# Patient Record
Sex: MALE | Race: OTHER | NOT HISPANIC OR LATINO
[De-identification: names, ages, dates, MRNs, and addresses within clinical notes are randomized per-mention and may not be internally consistent; named-entity substitution may affect disease eponyms.]

---

## 2017-06-16 ENCOUNTER — APPOINTMENT (OUTPATIENT)
Dept: OPHTHALMOLOGY | Facility: CLINIC | Age: 77
End: 2017-06-16

## 2017-06-16 DIAGNOSIS — H35.413 LATTICE DEGENERATION OF RETINA, BILATERAL: ICD-10-CM

## 2017-06-16 DIAGNOSIS — H33.002 UNSPECIFIED RETINAL DETACHMENT WITH RETINAL BREAK, LEFT EYE: ICD-10-CM

## 2017-06-16 DIAGNOSIS — H26.9 UNSPECIFIED CATARACT: ICD-10-CM

## 2017-06-16 DIAGNOSIS — H43.813 VITREOUS DEGENERATION, BILATERAL: ICD-10-CM

## 2017-06-16 DIAGNOSIS — H35.3130 NONEXUDATIVE AGE-RELATED MACULAR DEGENERATION, BILATERAL, STAGE UNSPECIFIED: ICD-10-CM

## 2017-12-15 ENCOUNTER — APPOINTMENT (OUTPATIENT)
Dept: OPHTHALMOLOGY | Facility: CLINIC | Age: 77
End: 2017-12-15

## 2020-10-23 ENCOUNTER — NON-APPOINTMENT (OUTPATIENT)
Age: 80
End: 2020-10-23

## 2020-10-23 ENCOUNTER — APPOINTMENT (OUTPATIENT)
Dept: OPHTHALMOLOGY | Facility: CLINIC | Age: 80
End: 2020-10-23
Payer: MEDICARE

## 2020-10-23 ENCOUNTER — APPOINTMENT (OUTPATIENT)
Dept: OPHTHALMOLOGY | Facility: CLINIC | Age: 80
End: 2020-10-23

## 2020-10-23 PROCEDURE — 92002 INTRM OPH EXAM NEW PATIENT: CPT

## 2020-11-11 ENCOUNTER — NON-APPOINTMENT (OUTPATIENT)
Age: 80
End: 2020-11-11

## 2020-11-11 ENCOUNTER — APPOINTMENT (OUTPATIENT)
Dept: OPHTHALMOLOGY | Facility: CLINIC | Age: 80
End: 2020-11-11
Payer: MEDICARE

## 2020-11-11 PROCEDURE — 92134 CPTRZ OPH DX IMG PST SGM RTA: CPT

## 2020-11-11 PROCEDURE — 92014 COMPRE OPH EXAM EST PT 1/>: CPT

## 2020-11-11 PROCEDURE — 92201 OPSCPY EXTND RTA DRAW UNI/BI: CPT

## 2021-05-12 ENCOUNTER — NON-APPOINTMENT (OUTPATIENT)
Age: 81
End: 2021-05-12

## 2021-05-12 ENCOUNTER — APPOINTMENT (OUTPATIENT)
Dept: OPHTHALMOLOGY | Facility: CLINIC | Age: 81
End: 2021-05-12
Payer: MEDICARE

## 2021-05-12 PROCEDURE — 92134 CPTRZ OPH DX IMG PST SGM RTA: CPT

## 2021-05-12 PROCEDURE — 92201 OPSCPY EXTND RTA DRAW UNI/BI: CPT

## 2021-05-12 PROCEDURE — 92014 COMPRE OPH EXAM EST PT 1/>: CPT

## 2021-11-18 ENCOUNTER — APPOINTMENT (OUTPATIENT)
Dept: OPHTHALMOLOGY | Facility: CLINIC | Age: 81
End: 2021-11-18

## 2021-12-01 ENCOUNTER — NON-APPOINTMENT (OUTPATIENT)
Age: 81
End: 2021-12-01

## 2021-12-01 ENCOUNTER — APPOINTMENT (OUTPATIENT)
Dept: OPHTHALMOLOGY | Facility: CLINIC | Age: 81
End: 2021-12-01
Payer: MEDICARE

## 2021-12-01 PROCEDURE — 92014 COMPRE OPH EXAM EST PT 1/>: CPT

## 2021-12-01 PROCEDURE — 92134 CPTRZ OPH DX IMG PST SGM RTA: CPT

## 2021-12-01 PROCEDURE — 92201 OPSCPY EXTND RTA DRAW UNI/BI: CPT

## 2022-02-08 ENCOUNTER — APPOINTMENT (OUTPATIENT)
Dept: OPHTHALMOLOGY | Facility: CLINIC | Age: 82
End: 2022-02-08
Payer: MEDICARE

## 2022-02-08 ENCOUNTER — NON-APPOINTMENT (OUTPATIENT)
Age: 82
End: 2022-02-08

## 2022-02-08 PROCEDURE — 92014 COMPRE OPH EXAM EST PT 1/>: CPT

## 2022-03-15 ENCOUNTER — APPOINTMENT (OUTPATIENT)
Dept: OPHTHALMOLOGY | Facility: CLINIC | Age: 82
End: 2022-03-15
Payer: MEDICARE

## 2022-03-15 ENCOUNTER — NON-APPOINTMENT (OUTPATIENT)
Age: 82
End: 2022-03-15

## 2022-03-15 PROCEDURE — 92012 INTRM OPH EXAM EST PATIENT: CPT

## 2022-03-15 PROCEDURE — 92133 CPTRZD OPH DX IMG PST SGM ON: CPT

## 2022-03-15 PROCEDURE — 92083 EXTENDED VISUAL FIELD XM: CPT

## 2022-03-26 ENCOUNTER — EMERGENCY (EMERGENCY)
Facility: HOSPITAL | Age: 82
LOS: 1 days | Discharge: HOME | End: 2022-03-26
Admitting: EMERGENCY MEDICINE
Payer: COMMERCIAL

## 2022-03-26 PROCEDURE — 99285 EMERGENCY DEPT VISIT HI MDM: CPT

## 2022-03-26 PROCEDURE — 70450 CT HEAD/BRAIN W/O DYE: CPT | Mod: 26

## 2022-03-26 PROCEDURE — 72125 CT NECK SPINE W/O DYE: CPT | Mod: 26
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
## 2022-03-28 DIAGNOSIS — R51.9 HEADACHE, UNSPECIFIED: ICD-10-CM
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
## 2022-08-18 ENCOUNTER — APPOINTMENT (OUTPATIENT)
Dept: OPHTHALMOLOGY | Facility: CLINIC | Age: 82
End: 2022-08-18

## 2022-08-18 ENCOUNTER — NON-APPOINTMENT (OUTPATIENT)
Age: 82
End: 2022-08-18

## 2022-08-18 PROCEDURE — 92014 COMPRE OPH EXAM EST PT 1/>: CPT

## 2022-08-18 PROCEDURE — 92201 OPSCPY EXTND RTA DRAW UNI/BI: CPT

## 2022-08-18 PROCEDURE — 92134 CPTRZ OPH DX IMG PST SGM RTA: CPT

## 2022-10-05 ENCOUNTER — NON-APPOINTMENT (OUTPATIENT)
Age: 82
End: 2022-10-05

## 2022-10-05 ENCOUNTER — APPOINTMENT (OUTPATIENT)
Dept: OPHTHALMOLOGY | Facility: CLINIC | Age: 82
End: 2022-10-05

## 2022-10-05 PROCEDURE — 92083 EXTENDED VISUAL FIELD XM: CPT

## 2022-10-05 PROCEDURE — 92134 CPTRZ OPH DX IMG PST SGM RTA: CPT

## 2022-10-05 PROCEDURE — 92012 INTRM OPH EXAM EST PATIENT: CPT

## 2022-10-05 PROCEDURE — 92136 OPHTHALMIC BIOMETRY: CPT

## 2023-02-23 ENCOUNTER — NON-APPOINTMENT (OUTPATIENT)
Age: 83
End: 2023-02-23

## 2023-02-23 ENCOUNTER — APPOINTMENT (OUTPATIENT)
Dept: OPHTHALMOLOGY | Facility: CLINIC | Age: 83
End: 2023-02-23
Payer: MEDICARE

## 2023-02-23 PROCEDURE — 92014 COMPRE OPH EXAM EST PT 1/>: CPT

## 2023-02-23 PROCEDURE — 92134 CPTRZ OPH DX IMG PST SGM RTA: CPT

## 2023-02-23 PROCEDURE — 92201 OPSCPY EXTND RTA DRAW UNI/BI: CPT

## 2023-04-05 ENCOUNTER — APPOINTMENT (OUTPATIENT)
Dept: OPHTHALMOLOGY | Facility: CLINIC | Age: 83
End: 2023-04-05
Payer: MEDICARE

## 2023-04-05 ENCOUNTER — NON-APPOINTMENT (OUTPATIENT)
Age: 83
End: 2023-04-05

## 2023-04-05 PROCEDURE — 92012 INTRM OPH EXAM EST PATIENT: CPT

## 2023-06-09 RX ORDER — SODIUM CHLORIDE 9 MG/ML
1000 INJECTION, SOLUTION INTRAVENOUS
Refills: 0 | Status: DISCONTINUED | OUTPATIENT
Start: 2023-06-12 | End: 2023-06-12

## 2023-06-09 NOTE — ASU PATIENT PROFILE, ADULT - NS PREOP UNDERSTANDS INFO
No solid food/dairy/candy/gum after 22:00pm Sunday, water allowed before 05:00am Monday. Patient reminded to come with photo ID/insurance card, dress comfortable/no jewelries/contact lens/valuables, no smoking/alcohol/recreational drug use Sunday; escort to come with photo ID, address and callback number was given./yes

## 2023-06-09 NOTE — ASU PATIENT PROFILE, ADULT - NSICDXPASTSURGICALHX_GEN_ALL_CORE_FT
PAST SURGICAL HISTORY:  H/O arthroscopy of knee right    H/O basal cell carcinoma excision Abdomen and left arm    H/O inguinal hernia repair right    H/O parathyroidectomy     H/O prostatectomy     H/O retinal detachment     Status post coronary artery stent placement

## 2023-06-09 NOTE — ASU PATIENT PROFILE, ADULT - FALL HARM RISK - CONCLUSION
This is a recent snapshot of the patient's [facility] medical record.  For current drug dose and complete information and questions, call [phone] or In Basket pool, [facility] ([id number])  CSN Number:  [id number]      
Universal Safety Interventions

## 2023-06-09 NOTE — ASU PATIENT PROFILE, ADULT - NSICDXPASTMEDICALHX_GEN_ALL_CORE_FT
PAST MEDICAL HISTORY:  Acute myocardial infarction     Afib     Carcinomas, basal cell     GERD (gastroesophageal reflux disease)     H/O erectile dysfunction     H/O retinal detachment left    Hypercholesterolemia     Prostate cancer

## 2023-06-12 ENCOUNTER — OUTPATIENT (OUTPATIENT)
Dept: OUTPATIENT SERVICES | Facility: HOSPITAL | Age: 83
LOS: 1 days | Discharge: HOME | End: 2023-06-12
Payer: MEDICARE

## 2023-06-12 ENCOUNTER — APPOINTMENT (OUTPATIENT)
Dept: OPHTHALMOLOGY | Facility: AMBULATORY SURGERY CENTER | Age: 83
End: 2023-06-12

## 2023-06-12 ENCOUNTER — NON-APPOINTMENT (OUTPATIENT)
Age: 83
End: 2023-06-12

## 2023-06-12 VITALS
SYSTOLIC BLOOD PRESSURE: 111 MMHG | TEMPERATURE: 98 F | DIASTOLIC BLOOD PRESSURE: 68 MMHG | RESPIRATION RATE: 15 BRPM | HEART RATE: 89 BPM

## 2023-06-12 VITALS
SYSTOLIC BLOOD PRESSURE: 118 MMHG | RESPIRATION RATE: 15 BRPM | TEMPERATURE: 99 F | WEIGHT: 185.63 LBS | DIASTOLIC BLOOD PRESSURE: 79 MMHG | OXYGEN SATURATION: 97 % | HEIGHT: 69 IN

## 2023-06-12 DIAGNOSIS — Z98.890 OTHER SPECIFIED POSTPROCEDURAL STATES: Chronic | ICD-10-CM

## 2023-06-12 DIAGNOSIS — Z90.79 ACQUIRED ABSENCE OF OTHER GENITAL ORGAN(S): Chronic | ICD-10-CM

## 2023-06-12 DIAGNOSIS — Z86.69 PERSONAL HISTORY OF OTHER DISEASES OF THE NERVOUS SYSTEM AND SENSE ORGANS: Chronic | ICD-10-CM

## 2023-06-12 DIAGNOSIS — Z95.5 PRESENCE OF CORONARY ANGIOPLASTY IMPLANT AND GRAFT: Chronic | ICD-10-CM

## 2023-06-12 DIAGNOSIS — E89.2 POSTPROCEDURAL HYPOPARATHYROIDISM: Chronic | ICD-10-CM

## 2023-06-12 PROCEDURE — 66984 XCAPSL CTRC RMVL W/O ECP: CPT | Mod: LT

## 2023-06-12 DEVICE — LENS IOL TECNIS PROTEC ZCB00 21.5D
Type: IMPLANTABLE DEVICE | Site: LEFT | Status: NON-FUNCTIONAL
Removed: 2023-06-12

## 2023-06-12 RX ORDER — METOPROLOL TARTRATE 50 MG
1 TABLET ORAL
Refills: 0 | DISCHARGE

## 2023-06-12 RX ORDER — TROPICAMIDE 1 %
1 DROPS OPHTHALMIC (EYE)
Refills: 0 | Status: COMPLETED | OUTPATIENT
Start: 2023-06-12 | End: 2023-06-12

## 2023-06-12 RX ORDER — ASPIRIN/CALCIUM CARB/MAGNESIUM 324 MG
1 TABLET ORAL
Refills: 0 | DISCHARGE

## 2023-06-12 RX ORDER — PHENYLEPHRINE HCL 2.5 %
1 DROPS OPHTHALMIC (EYE)
Refills: 0 | Status: COMPLETED | OUTPATIENT
Start: 2023-06-12 | End: 2023-06-12

## 2023-06-12 RX ORDER — KETOROLAC TROMETHAMINE 0.5 %
1 DROPS OPHTHALMIC (EYE)
Refills: 0 | Status: COMPLETED | OUTPATIENT
Start: 2023-06-12 | End: 2023-06-12

## 2023-06-12 RX ORDER — IBUPROFEN 200 MG
1 TABLET ORAL
Refills: 0 | DISCHARGE

## 2023-06-12 RX ORDER — VALACYCLOVIR 500 MG/1
4 TABLET, FILM COATED ORAL
Refills: 0 | DISCHARGE

## 2023-06-12 RX ORDER — POLYMYXIN B SULF/TRIMETHOPRIM 10000-1/ML
1 DROPS OPHTHALMIC (EYE)
Refills: 0 | Status: COMPLETED | OUTPATIENT
Start: 2023-06-12 | End: 2023-06-12

## 2023-06-12 RX ORDER — ACETAMINOPHEN 500 MG
650 TABLET ORAL ONCE
Refills: 0 | Status: DISCONTINUED | OUTPATIENT
Start: 2023-06-12 | End: 2023-06-12

## 2023-06-12 RX ORDER — RAMIPRIL 5 MG
0 CAPSULE ORAL
Refills: 0 | DISCHARGE

## 2023-06-12 RX ORDER — ROSUVASTATIN CALCIUM 5 MG/1
1 TABLET ORAL
Refills: 0 | DISCHARGE

## 2023-06-12 RX ORDER — MULTIVIT-MIN/FERROUS GLUCONATE 9 MG/15 ML
1 LIQUID (ML) ORAL
Refills: 0 | DISCHARGE

## 2023-06-12 RX ORDER — APIXABAN 2.5 MG/1
1 TABLET, FILM COATED ORAL
Refills: 0 | DISCHARGE

## 2023-06-12 RX ADMIN — Medication 1 DROP(S): at 07:25

## 2023-06-12 RX ADMIN — Medication 1 DROP(S): at 07:30

## 2023-06-12 RX ADMIN — Medication 1 DROP(S): at 07:24

## 2023-06-12 RX ADMIN — Medication 1 DROP(S): at 07:26

## 2023-06-12 RX ADMIN — Medication 1 DROP(S): at 07:27

## 2023-06-12 RX ADMIN — Medication 1 DROP(S): at 07:29

## 2023-06-12 NOTE — PRE-ANESTHESIA EVALUATION ADULT - NSANTHOSAYNRD_GEN_A_CORE
No. IRINEO screening performed.  STOP BANG Legend: 0-2 = LOW Risk; 3-4 = INTERMEDIATE Risk; 5-8 = HIGH Risk

## 2023-06-12 NOTE — ASU PREOP CHECKLIST - ALLERGIES REVIEWED
done Bilobed Transposition Flap Text: The defect edges were debeveled with a #15 scalpel blade.  Given the location of the defect and the proximity to free margins a bilobed transposition flap was deemed most appropriate.  Using a sterile surgical marker, an appropriate bilobe flap drawn around the defect.    The area thus outlined was incised deep to adipose tissue with a #15 scalpel blade.  The skin margins were undermined to an appropriate distance in all directions utilizing iris scissors.

## 2023-06-12 NOTE — OPERATIVE REPORT - OPERATIVE RPOSRT DETAILS
SURGEON:  JERRI MEADE M.D.    DATE OF SURGERY:  6/13/23    SURGICAL PROCEDURE:  CATARACT REMOVAL WITH LENS IMPLANT    LATERALITY:  LEFT EYE    COMPLEXITY:  NORMAL    IMPLANT USED:  ZCBOO 21.50 DIOPTERS    COMPLICATIONS:  NONE    ESTIMATED BLOOD LOSS: <1 cc    PROCEDURE:  THE PATIENT WAS BROUGHT TO THE OPERATING ROOM AFTER THE SURGICAL EYE WAS IDENTIFIED AND MARKED IN THE PREOPERATIVE HOLDING AREA.  ONCE IN THE O.R. THE PATIENT AND SURGICAL SITE WERE AGAIN IDENTIFIED BY ALL O.R. PERSONNEL DURING A FULL TIME OUT.   THE AREA ABOUT THE SURGICAL EYE WAS PREPPED AND DRAPED IN THE USUAL STERILE FASHION USING SOLUTIONS OF BETADINE AND ALCOHOL.   TOPICAL ANESTHESIA WAS INDUCED USING TETRACAINE.  A STERILE LID SPECULUM WAS PLACED BENEATH THE LIDS OF THE SURGICAL EYE.  IT WAS REMOVED PRIOR TO THE END OF THE PROCEDURE.  A 1 ML SUBCONJUNCTIVAL INJECTION OF LIDOCAINE 2% WITH EPINEPHRINE WAS ADMINISTERED SUPERIORLY AND A 4 MM AREA OF CONJUNCTIVA WAS OPENED AT THE LIMBUS USING A SELINA SCISSORS.   WET FIELD ELECTROCAUTERY WAS USED TO ACHIEVE HEMOSTASIS.   A SCLERAL INCISION WAS CREATED 1MM POSTERIOR TO THE SURGICAL LIMBUS USING A CRESCENT BLADE.   DISSECTION WAS TAKEN ANTERIORLY INTO CLEAR CORNEA WITHOUT ENTERING THE ANTERIOR CHAMBER.   NASAL AND TEMPORAL PERIPHERAL CORNEAL PARACENTESIS PORTS WERE CREATED WITH A 15 DEGREE SUPER BLADE.  INTRAOCULAR PRESERVATIVE FREE LIDOCAINE WAS INJECTED INTO THE ANTERIOR CHAMBER FOLLOWED BY INTRAOCULAR EPINEPHRINE.  AMVISC PLUS WAS INJECTED INTO THE ANTERIOR CHAMBER AND THE SCLERAL TUNNEL WAS COMPLETED USING A 2.4 MM BENT KERATOME.   A CONTINUOUS TEAR CAPSULORRHEXIS WAS THEN CREATED USING A CYSTITOME.   THIS WAS COMPLETED USING UTRATA FORCEPS.   HYDRODISSECTION WAN THEN PERFORMED.   A DIVIDE AND CONQUOR TECHNIQUE WAS THEN USED WITH THE PHACOEMULSIFICATION HANDPIECE TO REMOVE THE NUCLEUS OF THE CATARACT.   REMAINING CORTICAL FIBERS WERE REMOVED USING AUTOMATED IRRIGATION AND ASPIRATION.  THE CAPSULAR BAG WAS INSPECTED AND FOUND TO BE INTACT.  IT WAS FILLED WITH HEALON.  A POSTERIOR CHAMBER LENS WAS PLACED INTO THE CAPSULAR BAG AND ROTATED INTO POSITION.   IT WAS FOUND TO SPONTANEOUSLY CENTER.    REMAINING HEALON WAS REMOVED THOROUGHLY USING AUTOMATED IRRIGATION AND ASPIRATION.   THE SURGICAL WOUNDS WERE HYDRATED AND A SINGLE 10-0 NYLON STITCH WAS PLACED AT THE MAIN ENTRY SITE.  ALL WOUNDS WERE TESTED AND FOUND TO BE WATER TIGHT.   TOBRADEX EYE DROPS WERE PLACED ON THE CORNEA AND THE LID SPECULUM WAS REMOVED.  ERYTHROMYCIN OINTMENT WAS PLACED BENEATH A STERILE PATCH AND SHIELD.   THE PATIENT LEFT THE OPERATING ROOM IN STABLE CONDITION HAVING TOLERATED THE PROCEDURE WELL.

## 2023-06-13 ENCOUNTER — APPOINTMENT (OUTPATIENT)
Dept: OPHTHALMOLOGY | Facility: CLINIC | Age: 83
End: 2023-06-13
Payer: MEDICARE

## 2023-06-13 ENCOUNTER — NON-APPOINTMENT (OUTPATIENT)
Age: 83
End: 2023-06-13

## 2023-06-13 PROCEDURE — 99024 POSTOP FOLLOW-UP VISIT: CPT

## 2023-06-20 PROBLEM — E78.00 PURE HYPERCHOLESTEROLEMIA, UNSPECIFIED: Chronic | Status: ACTIVE | Noted: 2023-06-09

## 2023-06-20 PROBLEM — K21.9 GASTRO-ESOPHAGEAL REFLUX DISEASE WITHOUT ESOPHAGITIS: Chronic | Status: ACTIVE | Noted: 2023-06-09

## 2023-06-20 PROBLEM — I48.91 UNSPECIFIED ATRIAL FIBRILLATION: Chronic | Status: ACTIVE | Noted: 2023-06-09

## 2023-06-20 PROBLEM — C44.91 BASAL CELL CARCINOMA OF SKIN, UNSPECIFIED: Chronic | Status: ACTIVE | Noted: 2023-06-09

## 2023-06-20 PROBLEM — I21.9 ACUTE MYOCARDIAL INFARCTION, UNSPECIFIED: Chronic | Status: ACTIVE | Noted: 2023-06-09

## 2023-06-20 PROBLEM — C61 MALIGNANT NEOPLASM OF PROSTATE: Chronic | Status: ACTIVE | Noted: 2023-06-09

## 2023-06-20 PROBLEM — Z87.438 PERSONAL HISTORY OF OTHER DISEASES OF MALE GENITAL ORGANS: Chronic | Status: ACTIVE | Noted: 2023-06-09

## 2023-06-20 PROBLEM — Z86.69 PERSONAL HISTORY OF OTHER DISEASES OF THE NERVOUS SYSTEM AND SENSE ORGANS: Chronic | Status: ACTIVE | Noted: 2023-06-09

## 2023-06-21 ENCOUNTER — APPOINTMENT (OUTPATIENT)
Dept: OPHTHALMOLOGY | Facility: CLINIC | Age: 83
End: 2023-06-21
Payer: MEDICARE

## 2023-06-21 ENCOUNTER — NON-APPOINTMENT (OUTPATIENT)
Age: 83
End: 2023-06-21

## 2023-06-21 PROCEDURE — 99024 POSTOP FOLLOW-UP VISIT: CPT

## 2023-07-19 ENCOUNTER — APPOINTMENT (OUTPATIENT)
Dept: OPHTHALMOLOGY | Facility: CLINIC | Age: 83
End: 2023-07-19
Payer: MEDICARE

## 2023-07-19 ENCOUNTER — NON-APPOINTMENT (OUTPATIENT)
Age: 83
End: 2023-07-19

## 2023-07-19 PROCEDURE — 92134 CPTRZ OPH DX IMG PST SGM RTA: CPT

## 2023-07-19 PROCEDURE — 99024 POSTOP FOLLOW-UP VISIT: CPT

## 2023-08-10 ENCOUNTER — APPOINTMENT (OUTPATIENT)
Dept: OPHTHALMOLOGY | Facility: CLINIC | Age: 83
End: 2023-08-10
Payer: MEDICARE

## 2023-08-10 ENCOUNTER — NON-APPOINTMENT (OUTPATIENT)
Age: 83
End: 2023-08-10

## 2023-08-10 PROCEDURE — 99024 POSTOP FOLLOW-UP VISIT: CPT

## 2023-08-17 ENCOUNTER — APPOINTMENT (OUTPATIENT)
Dept: OPHTHALMOLOGY | Facility: CLINIC | Age: 83
End: 2023-08-17
Payer: MEDICARE

## 2023-08-17 ENCOUNTER — NON-APPOINTMENT (OUTPATIENT)
Age: 83
End: 2023-08-17

## 2023-08-17 PROCEDURE — 99024 POSTOP FOLLOW-UP VISIT: CPT

## 2023-08-24 ENCOUNTER — APPOINTMENT (OUTPATIENT)
Dept: OPHTHALMOLOGY | Facility: CLINIC | Age: 83
End: 2023-08-24
Payer: MEDICARE

## 2023-08-24 ENCOUNTER — NON-APPOINTMENT (OUTPATIENT)
Age: 83
End: 2023-08-24

## 2023-08-24 PROCEDURE — 92201 OPSCPY EXTND RTA DRAW UNI/BI: CPT

## 2023-08-24 PROCEDURE — 92012 INTRM OPH EXAM EST PATIENT: CPT | Mod: 24

## 2023-08-24 PROCEDURE — 92134 CPTRZ OPH DX IMG PST SGM RTA: CPT

## 2023-09-14 ENCOUNTER — NON-APPOINTMENT (OUTPATIENT)
Age: 83
End: 2023-09-14

## 2023-09-14 ENCOUNTER — APPOINTMENT (OUTPATIENT)
Dept: OPHTHALMOLOGY | Facility: CLINIC | Age: 83
End: 2023-09-14
Payer: MEDICARE

## 2023-09-14 PROCEDURE — 92012 INTRM OPH EXAM EST PATIENT: CPT

## 2023-09-18 ENCOUNTER — APPOINTMENT (OUTPATIENT)
Dept: OPHTHALMOLOGY | Facility: AMBULATORY SURGERY CENTER | Age: 83
End: 2023-09-18

## 2023-09-19 ENCOUNTER — APPOINTMENT (OUTPATIENT)
Dept: OPHTHALMOLOGY | Facility: CLINIC | Age: 83
End: 2023-09-19

## 2023-09-26 ENCOUNTER — APPOINTMENT (OUTPATIENT)
Dept: OPHTHALMOLOGY | Facility: CLINIC | Age: 83
End: 2023-09-26

## 2023-10-24 ENCOUNTER — APPOINTMENT (OUTPATIENT)
Dept: OPHTHALMOLOGY | Facility: CLINIC | Age: 83
End: 2023-10-24

## 2024-02-29 ENCOUNTER — APPOINTMENT (OUTPATIENT)
Dept: OPHTHALMOLOGY | Facility: CLINIC | Age: 84
End: 2024-02-29
Payer: MEDICARE

## 2024-02-29 ENCOUNTER — NON-APPOINTMENT (OUTPATIENT)
Age: 84
End: 2024-02-29

## 2024-02-29 PROCEDURE — 92014 COMPRE OPH EXAM EST PT 1/>: CPT

## 2024-02-29 PROCEDURE — 92134 CPTRZ OPH DX IMG PST SGM RTA: CPT

## 2024-02-29 PROCEDURE — 92201 OPSCPY EXTND RTA DRAW UNI/BI: CPT

## 2024-03-14 ENCOUNTER — NON-APPOINTMENT (OUTPATIENT)
Age: 84
End: 2024-03-14

## 2024-03-14 ENCOUNTER — APPOINTMENT (OUTPATIENT)
Dept: OPHTHALMOLOGY | Facility: CLINIC | Age: 84
End: 2024-03-14
Payer: MEDICARE

## 2024-03-14 PROCEDURE — 92012 INTRM OPH EXAM EST PATIENT: CPT

## 2024-05-09 ENCOUNTER — NON-APPOINTMENT (OUTPATIENT)
Age: 84
End: 2024-05-09

## 2024-05-09 ENCOUNTER — APPOINTMENT (OUTPATIENT)
Dept: OPHTHALMOLOGY | Facility: CLINIC | Age: 84
End: 2024-05-09
Payer: MEDICARE

## 2024-05-09 PROCEDURE — 92012 INTRM OPH EXAM EST PATIENT: CPT

## 2024-08-29 ENCOUNTER — APPOINTMENT (OUTPATIENT)
Dept: OPHTHALMOLOGY | Facility: CLINIC | Age: 84
End: 2024-08-29
Payer: MEDICARE

## 2024-08-29 ENCOUNTER — NON-APPOINTMENT (OUTPATIENT)
Age: 84
End: 2024-08-29

## 2024-08-29 PROCEDURE — 92201 OPSCPY EXTND RTA DRAW UNI/BI: CPT

## 2024-08-29 PROCEDURE — 92014 COMPRE OPH EXAM EST PT 1/>: CPT

## 2024-08-29 PROCEDURE — 92134 CPTRZ OPH DX IMG PST SGM RTA: CPT

## 2024-11-07 ENCOUNTER — NON-APPOINTMENT (OUTPATIENT)
Age: 84
End: 2024-11-07

## 2024-11-07 ENCOUNTER — APPOINTMENT (OUTPATIENT)
Dept: OPHTHALMOLOGY | Facility: CLINIC | Age: 84
End: 2024-11-07
Payer: MEDICARE

## 2024-11-07 PROCEDURE — 92014 COMPRE OPH EXAM EST PT 1/>: CPT

## 2025-01-02 ENCOUNTER — NON-APPOINTMENT (OUTPATIENT)
Age: 85
End: 2025-01-02

## 2025-01-02 ENCOUNTER — APPOINTMENT (OUTPATIENT)
Dept: OPHTHALMOLOGY | Facility: CLINIC | Age: 85
End: 2025-01-02
Payer: MEDICARE

## 2025-01-02 PROCEDURE — 92134 CPTRZ OPH DX IMG PST SGM RTA: CPT

## 2025-01-02 PROCEDURE — 92201 OPSCPY EXTND RTA DRAW UNI/BI: CPT

## 2025-01-02 PROCEDURE — 92014 COMPRE OPH EXAM EST PT 1/>: CPT

## 2025-01-30 RX ORDER — SODIUM CHLORIDE 9 G/ML
1000 INJECTION, SOLUTION INTRAVENOUS
Refills: 0 | Status: DISCONTINUED | OUTPATIENT
Start: 2025-02-03 | End: 2025-02-03

## 2025-01-30 NOTE — ASU PATIENT PROFILE, ADULT - NSICDXPASTSURGICALHX_GEN_ALL_CORE_FT
PAST SURGICAL HISTORY:  Cataract, left eye     H/O arthroscopy of knee right    H/O basal cell carcinoma excision Abdomen and left arm    H/O inguinal hernia repair right    H/O parathyroidectomy     H/O prostatectomy     H/O retinal detachment     Status post coronary artery stent placement

## 2025-01-30 NOTE — ASU PATIENT PROFILE, ADULT - NSICDXPASTMEDICALHX_GEN_ALL_CORE_FT
PAST MEDICAL HISTORY:  Acute myocardial infarction     Afib     Carcinomas, basal cell Right Ear/ Right leg    GERD (gastroesophageal reflux disease)     H/O erectile dysfunction     H/O retinal detachment left    Hypercholesterolemia     Prostate cancer

## 2025-01-30 NOTE — ASU PATIENT PROFILE, ADULT - MEDICATION ADMINISTRATION INFO, PROFILE
Comment: Pt is on acyclovir that’s she takes at the first sign of onset prescribed by pcp Render Risk Assessment In Note?: no Detail Level: Simple no concerns

## 2025-01-30 NOTE — ASU PATIENT PROFILE, ADULT - NS PREOP UNDERSTANDS INFO
Nothing to eat or drink after 11:30pm 2/2/2025/ photo ID and insurance card/ comfortable open front clothing/  [facility] will take him home/yes

## 2025-02-03 ENCOUNTER — TRANSCRIPTION ENCOUNTER (OUTPATIENT)
Age: 85
End: 2025-02-03

## 2025-02-03 ENCOUNTER — OUTPATIENT (OUTPATIENT)
Dept: OUTPATIENT SERVICES | Facility: HOSPITAL | Age: 85
LOS: 1 days | Discharge: HOME | End: 2025-02-03
Payer: MEDICARE

## 2025-02-03 ENCOUNTER — NON-APPOINTMENT (OUTPATIENT)
Age: 85
End: 2025-02-03

## 2025-02-03 ENCOUNTER — APPOINTMENT (OUTPATIENT)
Dept: OPHTHALMOLOGY | Facility: AMBULATORY SURGERY CENTER | Age: 85
End: 2025-02-03

## 2025-02-03 VITALS
RESPIRATION RATE: 16 BRPM | TEMPERATURE: 97 F | DIASTOLIC BLOOD PRESSURE: 67 MMHG | WEIGHT: 185.63 LBS | HEIGHT: 70 IN | SYSTOLIC BLOOD PRESSURE: 128 MMHG | HEART RATE: 87 BPM | OXYGEN SATURATION: 98 %

## 2025-02-03 VITALS
DIASTOLIC BLOOD PRESSURE: 71 MMHG | RESPIRATION RATE: 16 BRPM | TEMPERATURE: 97 F | HEART RATE: 71 BPM | OXYGEN SATURATION: 96 % | SYSTOLIC BLOOD PRESSURE: 113 MMHG

## 2025-02-03 DIAGNOSIS — Z90.79 ACQUIRED ABSENCE OF OTHER GENITAL ORGAN(S): Chronic | ICD-10-CM

## 2025-02-03 DIAGNOSIS — Z98.890 OTHER SPECIFIED POSTPROCEDURAL STATES: Chronic | ICD-10-CM

## 2025-02-03 DIAGNOSIS — E89.2 POSTPROCEDURAL HYPOPARATHYROIDISM: Chronic | ICD-10-CM

## 2025-02-03 DIAGNOSIS — Z95.5 PRESENCE OF CORONARY ANGIOPLASTY IMPLANT AND GRAFT: Chronic | ICD-10-CM

## 2025-02-03 DIAGNOSIS — H26.9 UNSPECIFIED CATARACT: Chronic | ICD-10-CM

## 2025-02-03 DIAGNOSIS — Z86.69 PERSONAL HISTORY OF OTHER DISEASES OF THE NERVOUS SYSTEM AND SENSE ORGANS: Chronic | ICD-10-CM

## 2025-02-03 PROCEDURE — 66984 XCAPSL CTRC RMVL W/O ECP: CPT | Mod: RT

## 2025-02-03 DEVICE — LENS IOL TECNIS PROTEC ZCB00 22.5D
Type: IMPLANTABLE DEVICE | Site: RIGHT | Status: NON-FUNCTIONAL
Removed: 2025-02-03

## 2025-02-03 RX ORDER — TROPICAMIDE 5 MG/ML
1 SOLUTION/ DROPS OPHTHALMIC
Refills: 0 | Status: COMPLETED | OUTPATIENT
Start: 2025-02-03 | End: 2025-02-03

## 2025-02-03 RX ORDER — KETOROLAC TROMETHAMINE 0.5 %
1 DROPS OPHTHALMIC (EYE)
Refills: 0 | Status: COMPLETED | OUTPATIENT
Start: 2025-02-03 | End: 2025-02-03

## 2025-02-03 RX ORDER — PHENYLEPHRINE HYDROCHLORIDE 25 MG/ML
1 SOLUTION OPHTHALMIC
Refills: 0 | Status: COMPLETED | OUTPATIENT
Start: 2025-02-03 | End: 2025-02-03

## 2025-02-03 RX ORDER — ACETAMINOPHEN 160 MG/5ML
1000 SUSPENSION ORAL ONCE
Refills: 0 | Status: DISCONTINUED | OUTPATIENT
Start: 2025-02-03 | End: 2025-02-03

## 2025-02-03 RX ADMIN — TROPICAMIDE 1 DROP(S): 5 SOLUTION/ DROPS OPHTHALMIC at 07:15

## 2025-02-03 RX ADMIN — TROPICAMIDE 1 DROP(S): 5 SOLUTION/ DROPS OPHTHALMIC at 07:20

## 2025-02-03 RX ADMIN — TROPICAMIDE 1 DROP(S): 5 SOLUTION/ DROPS OPHTHALMIC at 07:10

## 2025-02-03 RX ADMIN — Medication 1 DROP(S): at 07:20

## 2025-02-03 RX ADMIN — Medication 1 DROP(S): at 07:10

## 2025-02-03 RX ADMIN — PHENYLEPHRINE HYDROCHLORIDE 1 DROP(S): 25 SOLUTION OPHTHALMIC at 07:15

## 2025-02-03 RX ADMIN — Medication 1 DROP(S): at 07:15

## 2025-02-03 RX ADMIN — PHENYLEPHRINE HYDROCHLORIDE 1 DROP(S): 25 SOLUTION OPHTHALMIC at 07:20

## 2025-02-03 RX ADMIN — PHENYLEPHRINE HYDROCHLORIDE 1 DROP(S): 25 SOLUTION OPHTHALMIC at 07:10

## 2025-02-03 NOTE — ASU DISCHARGE PLAN (ADULT/PEDIATRIC) - NS MD DC FALL RISK RISK
For information on Fall & Injury Prevention, visit: [email or web address] OR  [email or web address] OR  https://www.cdc.gov/steadi/patient.html

## 2025-02-03 NOTE — ASU DISCHARGE PLAN (ADULT/PEDIATRIC) - FINANCIAL ASSISTANCE
[facility] provides services at a reduced cost to those who are determined to be eligible through [facility]’s financial assistance program. Information regarding [facility]’s financial assistance program can be found by going to [email or web address] or by calling [phone].

## 2025-02-03 NOTE — OPERATIVE REPORT - OPERATIVE RPOSRT DETAILS
SURGEON:  JERRI MEADE M.D.    PREOPERATIVE DIAGNOSIS:  CATARACT RIGHT EYE    POSTOPERATIVE DIAGNOSIS:  CATARACT RIGHT EYE    DATE OF SURGERY:  2/3/25    SURGICAL PROCEDURE:  CATARACT REMOVAL WITH LENS IMPLANT    LATERALITY:  RIGHT EYE    COMPLEXITY:  NORMAL    IMPLANT USED:  ZCBOO 22.50 DIOPTERS    COMPLICATIONS:  NONE    ESTIMATED BLOOD LOSS: <1 cc    PROCEDURE:  THE PATIENT WAS BROUGHT TO THE OPERATING ROOM AFTER THE SURGICAL EYE WAS IDENTIFIED AND MARKED IN THE PREOPERATIVE HOLDING AREA.  ONCE IN THE O.R. THE PATIENT AND SURGICAL SITE WERE AGAIN IDENTIFIED BY ALL O.R. PERSONNEL DURING A FULL TIME OUT.   THE AREA ABOUT THE SURGICAL EYE WAS PREPPED AND DRAPED IN THE USUAL STERILE FASHION USING SOLUTIONS OF BETADINE AND ALCOHOL.   TOPICAL ANESTHESIA WAS INDUCED USING TETRACAINE.  A STERILE LID SPECULUM WAS PLACED BENEATH THE LIDS OF THE SURGICAL EYE.  IT WAS REMOVED PRIOR TO THE END OF THE PROCEDURE.  A 1 ML SUBCONJUNCTIVAL INJECTION OF LIDOCAINE 2% WITH EPINEPHRINE WAS ADMINISTERED SUPERIORLY AND A 4 MM AREA OF CONJUNCTIVA WAS OPENED AT THE LIMBUS USING A SELINA SCISSORS.   WET FIELD ELECTROCAUTERY WAS USED TO ACHIEVE HEMOSTASIS.   A SCLERAL INCISION WAS CREATED 1MM POSTERIOR TO THE SURGICAL LIMBUS USING A CRESCENT BLADE.   DISSECTION WAS TAKEN ANTERIORLY INTO CLEAR CORNEA WITHOUT ENTERING THE ANTERIOR CHAMBER.   NASAL AND TEMPORAL PERIPHERAL CORNEAL PARACENTESIS PORTS WERE CREATED WITH A 15 DEGREE SUPER BLADE.  INTRAOCULAR PRESERVATIVE FREE LIDOCAINE WAS INJECTED INTO THE ANTERIOR CHAMBER FOLLOWED BY INTRAOCULAR EPINEPHRINE.  AMVISC PLUS WAS INJECTED INTO THE ANTERIOR CHAMBER AND THE SCLERAL TUNNEL WAS COMPLETED USING A 2.4 MM BENT KERATOME.   A CONTINUOUS TEAR CAPSULORRHEXIS WAS THEN CREATED USING A CYSTITOME.   THIS WAS COMPLETED USING UTRATA FORCEPS.   HYDRODISSECTION WAN THEN PERFORMED.   A DIVIDE AND CONQUOR TECHNIQUE WAS THEN USED WITH THE PHACOEMULSIFICATION HANDPIECE TO REMOVE THE NUCLEUS OF THE CATARACT.   REMAINING CORTICAL FIBERS WERE REMOVED USING AUTOMATED IRRIGATION AND ASPIRATION.  THE CAPSULAR BAG WAS INSPECTED AND FOUND TO BE INTACT.  IT WAS FILLED WITH HEALON.  A POSTERIOR CHAMBER LENS WAS PLACED INTO THE CAPSULAR BAG AND ROTATED INTO POSITION.   IT WAS FOUND TO SPONTANEOUSLY CENTER.    REMAINING HEALON WAS REMOVED THOROUGHLY USING AUTOMATED IRRIGATION AND ASPIRATION.   THE SURGICAL WOUNDS WERE HYDRATED AND A SINGLE 10-0 NYLON STITCH WAS PLACED AT THE MAIN ENTRY SITE.  ALL WOUNDS WERE TESTED AND FOUND TO BE WATER TIGHT.   TOBRADEX EYE DROPS WERE PLACED ON THE CORNEA AND THE LID SPECULUM WAS REMOVED.  ERYTHROMYCIN OINTMENT WAS PLACED BENEATH A STERILE PATCH AND SHIELD.   THE PATIENT LEFT THE OPERATING ROOM IN STABLE CONDITION HAVING TOLERATED THE PROCEDURE WELL.

## 2025-02-04 ENCOUNTER — APPOINTMENT (OUTPATIENT)
Dept: OPHTHALMOLOGY | Facility: CLINIC | Age: 85
End: 2025-02-04
Payer: MEDICARE

## 2025-02-04 ENCOUNTER — NON-APPOINTMENT (OUTPATIENT)
Age: 85
End: 2025-02-04

## 2025-02-04 PROCEDURE — 99024 POSTOP FOLLOW-UP VISIT: CPT

## 2025-02-13 ENCOUNTER — NON-APPOINTMENT (OUTPATIENT)
Age: 85
End: 2025-02-13

## 2025-02-13 ENCOUNTER — APPOINTMENT (OUTPATIENT)
Dept: OPHTHALMOLOGY | Facility: CLINIC | Age: 85
End: 2025-02-13

## 2025-02-13 ENCOUNTER — APPOINTMENT (OUTPATIENT)
Dept: OPHTHALMOLOGY | Facility: CLINIC | Age: 85
End: 2025-02-13
Payer: MEDICARE

## 2025-02-13 PROCEDURE — 99024 POSTOP FOLLOW-UP VISIT: CPT

## 2025-03-13 ENCOUNTER — NON-APPOINTMENT (OUTPATIENT)
Age: 85
End: 2025-03-13

## 2025-03-13 ENCOUNTER — APPOINTMENT (OUTPATIENT)
Dept: OPHTHALMOLOGY | Facility: CLINIC | Age: 85
End: 2025-03-13
Payer: MEDICARE

## 2025-03-13 PROCEDURE — 99024 POSTOP FOLLOW-UP VISIT: CPT

## 2025-06-12 ENCOUNTER — APPOINTMENT (OUTPATIENT)
Dept: OPHTHALMOLOGY | Facility: CLINIC | Age: 85
End: 2025-06-12
Payer: MEDICARE

## 2025-06-12 ENCOUNTER — NON-APPOINTMENT (OUTPATIENT)
Age: 85
End: 2025-06-12

## 2025-06-12 PROCEDURE — 92014 COMPRE OPH EXAM EST PT 1/>: CPT

## 2025-06-12 PROCEDURE — 92134 CPTRZ OPH DX IMG PST SGM RTA: CPT

## 2025-07-03 ENCOUNTER — APPOINTMENT (OUTPATIENT)
Dept: OPHTHALMOLOGY | Facility: CLINIC | Age: 85
End: 2025-07-03

## (undated) DEVICE — TRANSFORMER INTREPID I/A 0.3MM

## (undated) DEVICE — ELCTR BIPOLAR CORD J&J 12FT DISP

## (undated) DEVICE — NDL HYPO SAFE 27G X 5/8" (GRAY)

## (undated) DEVICE — PACK CENTURION 2.4MM

## (undated) DEVICE — PACK ANTERIOR SEGMENT

## (undated) DEVICE — ELCTR ERASER BI-P BVL 45DEG 18G

## (undated) DEVICE — Device

## (undated) DEVICE — CYSTOTOME IRRIG 27G 5X8IN 10SP

## (undated) DEVICE — CANNULA IRR ANT CHAMBER 30G

## (undated) DEVICE — PREP BETADINE 5% STERILE OPTHALMIC SOLUTION

## (undated) DEVICE — KNIFE ALCON STANDARD FULL HANDLE 15 DEG (PINK)

## (undated) DEVICE — NDL HYPO NONSAFE 30G X 0.5" (BEIGE)

## (undated) DEVICE — DRAPE MICROSCOPE KNOB COVER SMALL (2 PCS)

## (undated) DEVICE — DRSG STERISTRIPS 0.5 X 4"

## (undated) DEVICE — KNIFE ALCON SLIT INTREPID CLEAR-CUT SAFETY 2.4MM

## (undated) DEVICE — KIT CENTURION ANTERIOR

## (undated) DEVICE — KNIFE ALCON CRESCENT ANGLED BEVEL UP 2.3MM (PINK)

## (undated) DEVICE — SUT NYLON 10-0 12" CU-5

## (undated) DEVICE — TIP OZIL 12 DEGREE MINI FLARE

## (undated) DEVICE — GLV 7.5 PROTEXIS (WHITE)

## (undated) DEVICE — TUBING CENTURION PHACO DR T